# Patient Record
Sex: MALE | Race: WHITE | NOT HISPANIC OR LATINO | Employment: FULL TIME | URBAN - METROPOLITAN AREA
[De-identification: names, ages, dates, MRNs, and addresses within clinical notes are randomized per-mention and may not be internally consistent; named-entity substitution may affect disease eponyms.]

---

## 2020-05-12 ENCOUNTER — HOSPITAL LABORATORY (OUTPATIENT)
Dept: OTHER | Facility: CLINIC | Age: 50
End: 2020-05-12

## 2020-05-12 LAB
APPEARANCE FLD: NORMAL
APPEARANCE FLD: NORMAL
COLOR FLD: NORMAL
COLOR FLD: NORMAL
EOSINOPHIL NFR FLD MANUAL: 1 %
EOSINOPHIL NFR FLD MANUAL: 3 %
GRAM STN SPEC: NORMAL
LYMPHOCYTES NFR FLD MANUAL: 22 %
LYMPHOCYTES NFR FLD MANUAL: 59 %
MONOS+MACROS NFR FLD MANUAL: 12 %
MONOS+MACROS NFR FLD MANUAL: 18 %
NEUTS BAND NFR FLD MANUAL: 16 %
NEUTS BAND NFR FLD MANUAL: 63 %
OTHER CELLS FLD MANUAL: 6 %
SPECIMEN SOURCE FLD: NORMAL
SPECIMEN SOURCE FLD: NORMAL
SPECIMEN SOURCE: NORMAL
SPECIMEN SOURCE: NORMAL
WBC # FLD AUTO: 168 /UL
WBC # FLD AUTO: 323 /UL

## 2020-05-17 LAB
BACTERIA SPEC CULT: NO GROWTH
BACTERIA SPEC CULT: NO GROWTH
SPECIMEN SOURCE: NORMAL
SPECIMEN SOURCE: NORMAL

## 2020-05-26 LAB
BACTERIA SPEC CULT: NORMAL
BACTERIA SPEC CULT: NORMAL
Lab: NORMAL
Lab: NORMAL
SPECIMEN SOURCE: NORMAL
SPECIMEN SOURCE: NORMAL

## 2022-03-24 ENCOUNTER — OFFICE VISIT (OUTPATIENT)
Dept: OPTOMETRY | Facility: CLINIC | Age: 52
End: 2022-03-24
Payer: COMMERCIAL

## 2022-03-24 DIAGNOSIS — H52.03 HYPEROPIA OF BOTH EYES: Primary | ICD-10-CM

## 2022-03-24 DIAGNOSIS — H52.4 PRESBYOPIA: ICD-10-CM

## 2022-03-24 PROCEDURE — 92015 DETERMINE REFRACTIVE STATE: CPT | Performed by: OPTOMETRIST

## 2022-03-24 PROCEDURE — 92004 COMPRE OPH EXAM NEW PT 1/>: CPT | Performed by: OPTOMETRIST

## 2022-03-24 RX ORDER — AMLODIPINE BESYLATE AND ATORVASTATIN CALCIUM 10; 10 MG/1; MG/1
1 TABLET, FILM COATED ORAL DAILY
COMMUNITY

## 2022-03-24 ASSESSMENT — REFRACTION_MANIFEST
OD_SPHERE: +1.25
OD_SPHERE: +1.75
OD_ADD: +2.25
METHOD_AUTOREFRACTION: 1
OD_AXIS: 149
OS_SPHERE: +1.25
OS_CYLINDER: SPHERE
OD_CYLINDER: +0.25
OS_ADD: +2.25
OS_SPHERE: +1.25
OD_CYLINDER: SPHERE
OS_AXIS: 170
OS_CYLINDER: +0.50

## 2022-03-24 ASSESSMENT — VISUAL ACUITY
OD_SC: 20/70
CORRECTION_TYPE: GLASSES
METHOD: SNELLEN - LINEAR
OS_SC: 20/40
OD_CC: 20/20
OD_CC: 20/20
OS_CC: 20/20
OS_CC: 20/20

## 2022-03-24 ASSESSMENT — TONOMETRY
OD_IOP_MMHG: 16
IOP_METHOD: APPLANATION
OS_IOP_MMHG: 16

## 2022-03-24 ASSESSMENT — EXTERNAL EXAM - RIGHT EYE: OD_EXAM: NORMAL

## 2022-03-24 ASSESSMENT — REFRACTION_WEARINGRX
OD_ADD: +1.75
SPECS_TYPE: PAL
OS_SPHERE: +1.25
OD_SPHERE: +1.75
OS_ADD: +1.75

## 2022-03-24 ASSESSMENT — CUP TO DISC RATIO
OS_RATIO: 0.2
OD_RATIO: 0.1

## 2022-03-24 ASSESSMENT — SLIT LAMP EXAM - LIDS
COMMENTS: NORMAL
COMMENTS: NORMAL

## 2022-03-24 ASSESSMENT — CONF VISUAL FIELD
METHOD: COUNTING FINGERS
OS_NORMAL: 1
OD_NORMAL: 1

## 2022-03-24 ASSESSMENT — EXTERNAL EXAM - LEFT EYE: OS_EXAM: NORMAL

## 2022-03-24 NOTE — LETTER
3/24/2022         RE: Richard Lindsey  [address]  [place] [address]        Dear Colleague,    Thank you for referring your patient, Richard Lindsey, to the [facility]. Please see a copy of my visit note below.    Chief Complaint   Patient presents with     Annual Eye Exam        Last Eye Exam: 2 years ago  Dilated Previously: Declined dilation today    What are you currently using to see?  glasses       Distance Vision Acuity: Satisfied with vision    Near Vision Acuity: Satisfied with vision while reading and using computer with glasses    Eye Comfort: good  Do you use eye drops? 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       OBJECTIVE: See Ophthalmology exam    ASSESSMENT:    ICD-10-CM    1. Hyperopia of both eyes  H52.03    2. Presbyopia  H52.4        PLAN:   Update prescription   Explained need for dilation for complete view of retina     Leonela Arellano OD         Again, thank you for allowing me to participate in the care of your patient.        Sincerely,        Leonela Arellano, OD

## 2022-03-24 NOTE — PROGRESS NOTES
Chief Complaint   Patient presents with     Annual Eye Exam        Last Eye Exam: 2 years ago  Dilated Previously: Declined dilation today    What are you currently using to see?  glasses       Distance Vision Acuity: Satisfied with vision    Near Vision Acuity: Satisfied with vision while reading and using computer with glasses    Eye Comfort: good  Do you use eye drops? 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       OBJECTIVE: See Ophthalmology exam    ASSESSMENT:    ICD-10-CM    1. Hyperopia of both eyes  H52.03    2. Presbyopia  H52.4        PLAN:   Update prescription   Explained need for dilation for complete view of retina     Leonela Arellano OD

## 2024-01-13 ENCOUNTER — TELEPHONE (OUTPATIENT)
Dept: URGENT CARE | Facility: URGENT CARE | Age: 54
End: 2024-01-13
Payer: COMMERCIAL

## 2024-01-13 ENCOUNTER — OFFICE VISIT (OUTPATIENT)
Dept: URGENT CARE | Facility: URGENT CARE | Age: 54
End: 2024-01-13
Payer: COMMERCIAL

## 2024-01-13 VITALS
TEMPERATURE: 97.5 F | WEIGHT: 254 LBS | HEART RATE: 85 BPM | OXYGEN SATURATION: 98 % | SYSTOLIC BLOOD PRESSURE: 150 MMHG | RESPIRATION RATE: 16 BRPM | DIASTOLIC BLOOD PRESSURE: 96 MMHG

## 2024-01-13 DIAGNOSIS — R04.0 EPISTAXIS: Primary | ICD-10-CM

## 2024-01-13 LAB
BASOPHILS # BLD AUTO: 0 10E3/UL (ref 0–0.2)
BASOPHILS NFR BLD AUTO: 0 %
EOSINOPHIL # BLD AUTO: 0.1 10E3/UL (ref 0–0.7)
EOSINOPHIL NFR BLD AUTO: 1 %
ERYTHROCYTE [DISTWIDTH] IN BLOOD BY AUTOMATED COUNT: 12.2 % (ref 10–15)
HCT VFR BLD AUTO: 42.9 % (ref 40–53)
HGB BLD-MCNC: 14.8 G/DL (ref 13.3–17.7)
IMM GRANULOCYTES # BLD: 0 10E3/UL
IMM GRANULOCYTES NFR BLD: 0 %
LYMPHOCYTES # BLD AUTO: 2.2 10E3/UL (ref 0.8–5.3)
LYMPHOCYTES NFR BLD AUTO: 26 %
MCH RBC QN AUTO: 31.8 PG (ref 26.5–33)
MCHC RBC AUTO-ENTMCNC: 34.5 G/DL (ref 31.5–36.5)
MCV RBC AUTO: 92 FL (ref 78–100)
MONOCYTES # BLD AUTO: 0.8 10E3/UL (ref 0–1.3)
MONOCYTES NFR BLD AUTO: 9 %
NEUTROPHILS # BLD AUTO: 5.4 10E3/UL (ref 1.6–8.3)
NEUTROPHILS NFR BLD AUTO: 63 %
PLATELET # BLD AUTO: 278 10E3/UL (ref 150–450)
RBC # BLD AUTO: 4.66 10E6/UL (ref 4.4–5.9)
WBC # BLD AUTO: 8.5 10E3/UL (ref 4–11)

## 2024-01-13 PROCEDURE — 99203 OFFICE O/P NEW LOW 30 MIN: CPT | Performed by: PHYSICIAN ASSISTANT

## 2024-01-13 PROCEDURE — 85025 COMPLETE CBC W/AUTO DIFF WBC: CPT | Performed by: PHYSICIAN ASSISTANT

## 2024-01-13 PROCEDURE — 36415 COLL VENOUS BLD VENIPUNCTURE: CPT | Performed by: PHYSICIAN ASSISTANT

## 2024-01-13 RX ORDER — LOSARTAN POTASSIUM 100 MG/1
100 TABLET ORAL
COMMUNITY
Start: 2023-09-18

## 2024-01-13 RX ORDER — ECHINACEA PURPUREA EXTRACT 125 MG
TABLET ORAL
COMMUNITY
Start: 2023-02-14

## 2024-01-13 RX ORDER — ATORVASTATIN CALCIUM 40 MG/1
1 TABLET, FILM COATED ORAL DAILY
COMMUNITY
Start: 2023-09-18

## 2024-01-13 RX ORDER — CHLORTHALIDONE 25 MG/1
25 TABLET ORAL
COMMUNITY
Start: 2023-11-28

## 2024-01-13 NOTE — RESULT ENCOUNTER NOTE
Please call patient to leave a detailed message his complete blood count and platelet results are normal. He requested we leave a detailed message for this result.

## 2024-01-13 NOTE — PROGRESS NOTES
ASSESSMENT/PLAN:    (R04.0) Epistaxis  (primary encounter diagnosis)    MDM: Recurrent left nare epistaxis by patient report.  No active bleeding now.  History of needing  nasal cautery.    Plan: CBC with Platelets & Differential, Adult ENT          Referral            Urgent Care Plan:     -Nasal prong was provided to use for future nosebleeds  -Okay to try a spray of Afrin on a rolled up piece of gauze to insert in left nostril to help if prolonged bleeding  -Referral to ear nose and throat doctor for further evaluation/assessment       This progress note has been dictated, with use of voice recognition software. Any grammatical, typographical, or context errors are unintentional and inherent to use of voice recognition software.  ------------------      Chief Complaint   Patient presents with    Urgent Care     Continual bloody nose only left side     Richard Lindsey is a 53-year-old male who presents to urgent care today for evaluation of recurrent bleeding from left nostril.    HPI: Patient states he has been having intermittent nosebleeding from left nare for about a month.  He notes increased frequency over the past 3 days (reports he has had bright red nasal bleeding out of left nostril each day for the past 3 days).    Home Treatment: Patient states he has been able to stop the bleeding on his own (by pinching his nose and sometimes using a spray of over-the-counter Afrin).  He is also been using some over the counter saline spray and gel.  No bleeding lasting more than 10 minutes.    Of Note: Patient reports he did have to see an ear nose and throat doctor as a child to have nasal cautery for similar symptoms and wonders if he should see an ear nose and throat doctor.    Patient states he is not taking any NSAIDs or aspirin.  No prescription blood thinner.    Review of systems:   Consitutional: No acute fever, chills, or unexplained weight loss  Cardiac today's blood pressure is elevated at  150/96-patient states he has both hypertension (for which she is taking COVID at and Cozaar) as well as longstanding whitecoat hypertension.  Patient reports home blood pressure readings are lower than today's urgent care visit.  Heme: No history of known blood dyscrasias.  No unexplained bruising or bleeding elsewhere.  No prolonged bleeding with accidental cuts.  No unexplained gum bleeding.      Past Medical History:   Diagnosis Date    Hypertension      There is no problem list on file for this patient.    Current Outpatient Medications   Medication    amLODIPine-atorvastatin (CADUET) 10-10 MG tablet    atorvastatin (LIPITOR) 40 MG tablet    chlorthalidone (HYGROTON) 25 MG tablet    losartan (COZAAR) 100 MG tablet    sodium chloride (OCEAN) 0.65 % nasal spray     No current facility-administered medications for this visit.     No Known Allergies    GENERAL:  Very pleasant, comfortable and generally well appearing.  SKIN: Uniform in color.  No systemic rash or hives.  No ecchymoses.  HEENT:   Conjunctiva without infection.  Sclera clear.  Left TM is normal: no effusions, no erythema, and normal landmarks.  Right TM is normal: no effusions, no erythema, and normal landmarks.  Nasal mucosa is is clear.  I do not see any obvious erosions.  There is no active bleeding at this time.  Oropharyngeal exam is normal: no lesions, erythema, adenopathy or exudate.  Neck is supple, FROM with no adenopathy  CARDIAC:NORMAL - regular rate and rhythm without murmur., normal s1/s2 and without extra heart sounds  RESP: Normal - CTA without rales, rhonchi, or wheezing.    Results for orders placed or performed in visit on 01/13/24   CBC with platelets and differential     Status: None   Result Value Ref Range    WBC Count 8.5 4.0 - 11.0 10e3/uL    RBC Count 4.66 4.40 - 5.90 10e6/uL    Hemoglobin 14.8 13.3 - 17.7 g/dL    Hematocrit 42.9 40.0 - 53.0 %    MCV 92 78 - 100 fL    MCH 31.8 26.5 - 33.0 pg    MCHC 34.5 31.5 - 36.5 g/dL     RDW 12.2 10.0 - 15.0 %    Platelet Count 278 150 - 450 10e3/uL    % Neutrophils 63 %    % Lymphocytes 26 %    % Monocytes 9 %    % Eosinophils 1 %    % Basophils 0 %    % Immature Granulocytes 0 %    Absolute Neutrophils 5.4 1.6 - 8.3 10e3/uL    Absolute Lymphocytes 2.2 0.8 - 5.3 10e3/uL    Absolute Monocytes 0.8 0.0 - 1.3 10e3/uL    Absolute Eosinophils 0.1 0.0 - 0.7 10e3/uL    Absolute Basophils 0.0 0.0 - 0.2 10e3/uL    Absolute Immature Granulocytes 0.0 <=0.4 10e3/uL   CBC with Platelets & Differential     Status: None    Narrative    The following orders were created for panel order CBC with Platelets & Differential.  Procedure                               Abnormality         Status                     ---------                               -----------         ------                     CBC with platelets and d...[[id number]]                      Final result                 Please view results for these tests on the individual orders.     CBC with differential is done to screen for blood dyscrasia.

## 2024-01-15 NOTE — TELEPHONE ENCOUNTER
FUTURE VISIT INFORMATION      FUTURE VISIT INFORMATION:  Date: 2/1/24  Time: 1:20pm  Location: [facility]  REFERRAL INFORMATION:  Referring provider:  Antwan Felipe PA-C   Referring providers clinic:   URGENT CARE   Reason for visit/diagnosis  Priority: 1-2 Weeks, Epistaxis, recurrent, left nostril, Antwan Felipe PA-C in  URGENT CARE     RECORDS REQUESTED FROM:       Clinic name Comments Records Status Imaging Status    URGENT CARE   1/13/24- Ov Antwan Cummins PA-C  Epic

## 2024-02-01 ENCOUNTER — PRE VISIT (OUTPATIENT)
Dept: OTOLARYNGOLOGY | Facility: CLINIC | Age: 54
End: 2024-02-01

## 2024-02-09 NOTE — TELEPHONE ENCOUNTER
"FUTURE VISIT INFORMATION      FUTURE VISIT INFORMATION:  Date: 4/19/24  Time: 3:20pm  Location: [facility]  REFERRAL INFORMATION:  Referring provider:  Antwan Felipe PA-C   Referring providers clinic:   URGENT CARE   Reason for visit/diagnosis  Priority: 1-2 Weeks, Epistaxis, recurrent, left nostril, Antwan Felipe PA-C in  URGENT CARE  CONF LOC  SCHED W/PT     RECORDS REQUESTED FROM:       Clinic name Comments Records Status Imaging Status    URGENT CARE   1/13/24- Ov Antwan Cummins PA-C  Epic                  \"Please notify/message CSS if patient completed outside imaging prior to scheduled appointment and/or any outside records that might have been missed at pre visit -Thank you\"  "

## 2024-04-18 ENCOUNTER — TELEPHONE (OUTPATIENT)
Dept: OTOLARYNGOLOGY | Facility: CLINIC | Age: 54
End: 2024-04-18
Payer: COMMERCIAL

## 2024-04-19 ENCOUNTER — OFFICE VISIT (OUTPATIENT)
Dept: OTOLARYNGOLOGY | Facility: CLINIC | Age: 54
End: 2024-04-19
Payer: COMMERCIAL

## 2024-04-19 ENCOUNTER — PRE VISIT (OUTPATIENT)
Dept: OTOLARYNGOLOGY | Facility: CLINIC | Age: 54
End: 2024-04-19

## 2024-04-19 VITALS
WEIGHT: 265.1 LBS | HEART RATE: 96 BPM | OXYGEN SATURATION: 95 % | SYSTOLIC BLOOD PRESSURE: 140 MMHG | DIASTOLIC BLOOD PRESSURE: 101 MMHG | HEIGHT: 72 IN | BODY MASS INDEX: 35.91 KG/M2

## 2024-04-19 DIAGNOSIS — R04.0 EPISTAXIS: Primary | ICD-10-CM

## 2024-04-19 PROCEDURE — 30901 CONTROL OF NOSEBLEED: CPT | Mod: LT | Performed by: STUDENT IN AN ORGANIZED HEALTH CARE EDUCATION/TRAINING PROGRAM

## 2024-04-19 PROCEDURE — 99203 OFFICE O/P NEW LOW 30 MIN: CPT | Mod: 25 | Performed by: STUDENT IN AN ORGANIZED HEALTH CARE EDUCATION/TRAINING PROGRAM

## 2024-04-19 RX ORDER — AMLODIPINE BESYLATE 10 MG/1
1 TABLET ORAL EVERY MORNING
COMMUNITY
Start: 2023-09-18

## 2024-04-19 RX ORDER — MUPIROCIN 20 MG/G
OINTMENT TOPICAL
Qty: 22 G | Refills: 3 | Status: SHIPPED | OUTPATIENT
Start: 2024-04-19 | End: 2024-04-29

## 2024-04-19 RX ORDER — PAROXETINE 40 MG/1
40 TABLET, FILM COATED ORAL
COMMUNITY
Start: 2023-09-18

## 2024-04-19 ASSESSMENT — PAIN SCALES - GENERAL: PAINLEVEL: NO PAIN (0)

## 2024-04-19 NOTE — PATIENT INSTRUCTIONS
You were seen in the ENT Clinic today by Dr. Hawkins. If you have any questions or concerns after your appointment, please contact us (see below)    The following has been recommended for you based upon your appointment today:  Nose bleed prevention:   Mupirocin for 10 days   After 10 days start AYR gel 2 times a day daily.  Place a small amount in the front of the nose on both sides and gently pinch your nose to coat the inside of the nose.  Use saline spray throughout the day as needed if your nose feels dry  Run a humidifier at the bedside  If you use a CPAP machine please ensure that it is humidified.  In case of a nose bleed: spray afrin (generic: oxymetazoline) 4-5 times in both sides of the nose and hold firm pressure over the fleshy part of the nose for 15 minutes.  Thereafter, check to see if you are still bleeding.  If you are, repeat.  If you have done this twice and are continuing to bleed please seek medical attention either in the ED or send a message to our office through [employer] or via phone call.      Please return to clinic as needed    How to Contact Us:  Send a AwesomeTouch message to your provider. Our team will respond to you via [employer]. Occasionally, we will need to call you to get further information.  For urgent matters (Monday-Friday), call the ENT Clinic: [phone] and speak with a call center team member - they will route your call appropriately.   If you'd like to speak directly with a nurse, please find our contact information below. We do our best to check voicemail frequently throughout the day, and will work to call you back within 1-2 days. For urgent matters, please use the general clinic phone numbers listed above.    Emmanuelle COSTA RN, BSN   RN Care Coordinator, ENT Clinic  [facility]  Direct: [phone]  Linda DOUGLASS LPN  Direct: [phone]

## 2024-04-19 NOTE — NURSING NOTE
Chief Complaint   Patient presents with    Consult   Blood pressure (!) 140/101, pulse 96, height 1.829 m (6'), weight 120.2 kg (265 lb 1.6 oz), SpO2 95%. Ghulam Nova, EMT

## 2024-04-19 NOTE — LETTER
4/19/2024       RE: Richard Lindsey  [address]  [place] [address]     Dear Colleague,    Thank you for referring your patient, Richard Lindsey, to the [facility] [place] [facility]. Please see a copy of my visit note below.      [facility] - Rhinology  New Patient Visit      Encounter date:   April 20, 2024    Referring Provider:  Antwan Felipe PA-C  [address] [place] [address]    Assessment, Decision Making, and Plan:  (R04.0) Epistaxis  (primary encounter diagnosis)  Comment:   --left anterior KP, s/p cautery  Plan: IMAGESTREAM RECORDING ORDER, mupirocin         (BACTROBAN) 2 % external ointment  --Nose bleed prevention:   Use mupirocin ointiment twice a day to the left side of the nose for 10 days, then,  Use AYR gel 2-3 times a day daily.  Place a small amount in the front of the nose on both sides and gently pinch your nose to coat the inside of the nose.  Use saline spray throughout the day as needed if your nose feels dry  Run a humidifier at the bedside  If you use a CPAP machine please ensure that it is humidified.  In case of a nose bleed: spray afrin (generic: oxymetazoline) 4-5 times in both sides of the nose and hold firm pressure over the fleshy part of the nose for 15 minutes.  Thereafter, check to see if you are still bleeding.  If you are, repeat.  If you have done this twice and are continuing to bleed please seek medical attention either in the ED or send a message to our office through [employer] or via phone call.         Chief Complaint: epistaxis    History of Present Illness:   Richard Lindsey is a 54 year old male who presents for consultation regarding epistaxis.    Previously in the service  Had nose bleeds  Recently has been having left side nose bleeds  Worse in the winter    No history of sinonasal surgery, AC, facial trauma/skull base fracture, no  supplemental garlic ginseng gingko cumin fish oil vitamin E, no CPAP, supplemental oxygen    Was cauterized as a child on the left    Review of systems: A 14-point review of systems has been conducted and was negative for any notable symptoms, except as dictated in the history of present illness.     Medical History:  Past Medical History:   Diagnosis Date     Hypertension         Surgical History:   No past surgical history on file.     Family History:  Family History   Problem Relation Age of Onset     Glaucoma No family hx of      Macular Degeneration No family hx of         Social History:   Social History     Socioeconomic History     Marital status:    Tobacco Use     Smoking status: Never     Smokeless tobacco: Current        Physical Exam:  Vital signs: BP (!) 140/101 (BP Location: Right arm, Patient Position: Sitting, Cuff Size: Adult Regular)   Pulse 96   Ht 1.829 m (6')   Wt 120.2 kg (265 lb 1.6 oz)   SpO2 95%   BMI 35.95 kg/m     General Appearance: No acute distress, appropriate demeanor, conversant  Eyes: moist conjunctivae; EOMI; pupils symmetric; visual acuity grossly intact; no proptosis  Head: normocephalic; overall symmetric appearance without deformity  Face: overall symmetric without deformity  Ears: Normal appearance of external ear; external meatus normal in appearance; TMs intact without perforation bilaterally;   Nose: No external deformity; septum (prominent vessel of KP on the left) ; inferior turbinates (non hypertrophic)   Oral Cavity/oropharynx: Normal appearance of mucosa; tongue midline; no mass or lesions; oropharynx without obvious mucosal abnormality  Neck: no palpable lymphadenopathy; thyroid without palpable nodules  Lungs: symmetric chest rise; no wheezing  CV: Good distal perfusion; normal hear rate  Extremities: No deformity  Neurologic Exam: Cranial nerves II-XII are grossly intact; no focal deficit    Procedure Note  Procedure performed: Rigid nasal endoscopy,  control of epistaxis anterior simple  Indication: To evaluate for sinonasal pathology not visualized on routine anterior rhinoscopy  Anesthesia: 4% topical lidocaine with 0.05% oxymetazoline  Description of procedure: A 30 degree, 3 mm rigid endoscope was inserted into bilateral nasal cavities and the nasal valves, nasal cavity, middle meatus, sphenoethmoid recess, and nasopharynx were thoroughly evaluated for evidence of obstruction, edema, purulence, polyps and/or mass/lesion.     Findings:    Large vessel of left KP cauterized with silver nitrate, dressed with surgicel  No posterior source of bleeding    The patient tolerated the procedure well without complication.       Naresh Hawkins MD    [facility]  Rhinology  Endoscopic Skull Base Surgery  [facility]  Department of Otolaryngology - Head & Neck Surgery          Again, thank you for allowing me to participate in the care of your patient.      Sincerely,    Naresh Hawkins MD

## 2024-04-20 NOTE — PROGRESS NOTES
[facility] - Rhinology  New Patient Visit      Encounter date:   April 20, 2024    Referring Provider:  Antwan Felipe PA-C  [address] [place] [address]    Assessment, Decision Making, and Plan:  (R04.0) Epistaxis  (primary encounter diagnosis)  Comment:   --left anterior KP, s/p cautery  Plan: IMAGESTREAM RECORDING ORDER, mupirocin         (BACTROBAN) 2 % external ointment  o --Nose bleed prevention:   i. Use mupirocin ointiment twice a day to the left side of the nose for 10 days, then,  ii. Use AYR gel 2-3 times a day daily.  Place a small amount in the front of the nose on both sides and gently pinch your nose to coat the inside of the nose.  iii. Use saline spray throughout the day as needed if your nose feels dry  iv. Run a humidifier at the bedside  v. If you use a CPAP machine please ensure that it is humidified.  o In case of a nose bleed: spray afrin (generic: oxymetazoline) 4-5 times in both sides of the nose and hold firm pressure over the fleshy part of the nose for 15 minutes.  Thereafter, check to see if you are still bleeding.  If you are, repeat.  If you have done this twice and are continuing to bleed please seek medical attention either in the ED or send a message to our office through [employer] or via phone call.         Chief Complaint: epistaxis    History of Present Illness:   Richard Lindsey is a 54 year old male who presents for consultation regarding epistaxis.    Previously in the service  Had nose bleeds  Recently has been having left side nose bleeds  Worse in the winter    No history of sinonasal surgery, AC, facial trauma/skull base fracture, no supplemental garlic ginseng gingko cumin fish oil vitamin E, no CPAP, supplemental oxygen    Was cauterized as a child on the left    Review of systems: A 14-point review of systems has been conducted and was negative for any notable symptoms, except as dictated in the history of present illness.      Medical History:  Past Medical History:   Diagnosis Date     Hypertension         Surgical History:   No past surgical history on file.     Family History:  Family History   Problem Relation Age of Onset     Glaucoma No family hx of      Macular Degeneration No family hx of         Social History:   Social History     Socioeconomic History     Marital status:    Tobacco Use     Smoking status: Never     Smokeless tobacco: Current        Physical Exam:  Vital signs: BP (!) 140/101 (BP Location: Right arm, Patient Position: Sitting, Cuff Size: Adult Regular)   Pulse 96   Ht 1.829 m (6')   Wt 120.2 kg (265 lb 1.6 oz)   SpO2 95%   BMI 35.95 kg/m     General Appearance: No acute distress, appropriate demeanor, conversant  Eyes: moist conjunctivae; EOMI; pupils symmetric; visual acuity grossly intact; no proptosis  Head: normocephalic; overall symmetric appearance without deformity  Face: overall symmetric without deformity  Ears: Normal appearance of external ear; external meatus normal in appearance; TMs intact without perforation bilaterally;   Nose: No external deformity; septum (prominent vessel of KP on the left) ; inferior turbinates (non hypertrophic)   Oral Cavity/oropharynx: Normal appearance of mucosa; tongue midline; no mass or lesions; oropharynx without obvious mucosal abnormality  Neck: no palpable lymphadenopathy; thyroid without palpable nodules  Lungs: symmetric chest rise; no wheezing  CV: Good distal perfusion; normal hear rate  Extremities: No deformity  Neurologic Exam: Cranial nerves II-XII are grossly intact; no focal deficit    Procedure Note  Procedure performed: Rigid nasal endoscopy, control of epistaxis anterior simple  Indication: To evaluate for sinonasal pathology not visualized on routine anterior rhinoscopy  Anesthesia: 4% topical lidocaine with 0.05% oxymetazoline  Description of procedure: A 30 degree, 3 mm rigid endoscope was inserted into bilateral nasal cavities and  the nasal valves, nasal cavity, middle meatus, sphenoethmoid recess, and nasopharynx were thoroughly evaluated for evidence of obstruction, edema, purulence, polyps and/or mass/lesion.     Findings:    Large vessel of left KP cauterized with silver nitrate, dressed with surgicel  No posterior source of bleeding    The patient tolerated the procedure well without complication.       Naresh Hawkins MD    [facility]  Rhinology  Endoscopic Skull Base Surgery  [facility]  Department of Otolaryngology - Head & Neck Surgery